# Patient Record
Sex: MALE | Race: WHITE | Employment: FULL TIME | URBAN - METROPOLITAN AREA
[De-identification: names, ages, dates, MRNs, and addresses within clinical notes are randomized per-mention and may not be internally consistent; named-entity substitution may affect disease eponyms.]

---

## 2017-03-23 ENCOUNTER — OFFICE VISIT (OUTPATIENT)
Dept: NEUROLOGY | Age: 27
End: 2017-03-23

## 2017-03-23 VITALS
HEART RATE: 64 BPM | BODY MASS INDEX: 20.9 KG/M2 | DIASTOLIC BLOOD PRESSURE: 85 MMHG | WEIGHT: 146 LBS | SYSTOLIC BLOOD PRESSURE: 142 MMHG | HEIGHT: 70 IN

## 2017-03-23 DIAGNOSIS — G40.309 GENERALIZED IDIOPATHIC EPILEPSY, NOT INTRACTABLE, WITHOUT STATUS EPILEPTICUS (HCC): Primary | ICD-10-CM

## 2017-03-23 PROCEDURE — 99214 OFFICE O/P EST MOD 30 MIN: CPT | Performed by: PSYCHIATRY & NEUROLOGY

## 2017-04-11 ENCOUNTER — TELEPHONE (OUTPATIENT)
Dept: NEUROLOGY | Age: 27
End: 2017-04-11

## 2018-04-16 ENCOUNTER — OFFICE VISIT (OUTPATIENT)
Dept: NEUROLOGY | Age: 28
End: 2018-04-16

## 2018-04-16 VITALS
HEIGHT: 71 IN | SYSTOLIC BLOOD PRESSURE: 131 MMHG | WEIGHT: 149.2 LBS | DIASTOLIC BLOOD PRESSURE: 75 MMHG | HEART RATE: 54 BPM | BODY MASS INDEX: 20.89 KG/M2

## 2018-04-16 DIAGNOSIS — G40.309 GENERALIZED IDIOPATHIC EPILEPSY, NOT INTRACTABLE, WITHOUT STATUS EPILEPTICUS (HCC): Primary | ICD-10-CM

## 2018-04-16 DIAGNOSIS — G40.309 NONINTRACTABLE GENERALIZED IDIOPATHIC EPILEPSY WITHOUT STATUS EPILEPTICUS (HCC): ICD-10-CM

## 2018-04-16 PROCEDURE — 99215 OFFICE O/P EST HI 40 MIN: CPT | Performed by: PSYCHIATRY & NEUROLOGY

## 2018-04-16 RX ORDER — DIVALPROEX SODIUM 500 MG
TABLET, EXTENDED RELEASE 24 HR ORAL
Qty: 360 TABLET | Refills: 0 | Status: SHIPPED | OUTPATIENT
Start: 2018-04-16 | End: 2018-05-10 | Stop reason: SDUPTHER

## 2018-04-19 DIAGNOSIS — G40.309 NONINTRACTABLE GENERALIZED IDIOPATHIC EPILEPSY WITHOUT STATUS EPILEPTICUS (HCC): ICD-10-CM

## 2018-04-19 DIAGNOSIS — G40.309 GENERALIZED IDIOPATHIC EPILEPSY, NOT INTRACTABLE, WITHOUT STATUS EPILEPTICUS (HCC): ICD-10-CM

## 2018-05-10 DIAGNOSIS — G40.309 GENERALIZED IDIOPATHIC EPILEPSY, NOT INTRACTABLE, WITHOUT STATUS EPILEPTICUS (HCC): ICD-10-CM

## 2018-05-10 RX ORDER — DIVALPROEX SODIUM 500 MG
TABLET, EXTENDED RELEASE 24 HR ORAL
Qty: 400 TABLET | Refills: 0
Start: 2018-05-10 | End: 2018-10-22 | Stop reason: SDUPTHER

## 2018-10-22 DIAGNOSIS — G40.309 GENERALIZED IDIOPATHIC EPILEPSY, NOT INTRACTABLE, WITHOUT STATUS EPILEPTICUS (HCC): ICD-10-CM

## 2018-10-22 RX ORDER — DIVALPROEX SODIUM 500 MG
TABLET, EXTENDED RELEASE 24 HR ORAL
Qty: 400 TABLET | Refills: 0 | Status: SHIPPED | OUTPATIENT
Start: 2018-10-15 | End: 2018-11-13 | Stop reason: SDUPTHER

## 2018-10-22 NOTE — TELEPHONE ENCOUNTER
We received Depakote 500 mg tablets from from [address] [place] [address]. The medications were picked up by Juan Romero 10/15//2018.

## 2018-11-13 ENCOUNTER — OFFICE VISIT (OUTPATIENT)
Dept: NEUROLOGY | Age: 28
End: 2018-11-13

## 2018-11-13 VITALS
HEART RATE: 58 BPM | BODY MASS INDEX: 21.14 KG/M2 | DIASTOLIC BLOOD PRESSURE: 83 MMHG | WEIGHT: 151 LBS | HEIGHT: 71 IN | SYSTOLIC BLOOD PRESSURE: 139 MMHG

## 2018-11-13 DIAGNOSIS — G40.309 GENERALIZED IDIOPATHIC EPILEPSY, NOT INTRACTABLE, WITHOUT STATUS EPILEPTICUS (HCC): ICD-10-CM

## 2018-11-13 DIAGNOSIS — G40.309 NONINTRACTABLE GENERALIZED IDIOPATHIC EPILEPSY WITHOUT STATUS EPILEPTICUS (HCC): Primary | ICD-10-CM

## 2018-11-13 PROCEDURE — 99214 OFFICE O/P EST MOD 30 MIN: CPT | Performed by: PSYCHIATRY & NEUROLOGY

## 2018-11-13 RX ORDER — DIVALPROEX SODIUM 500 MG
TABLET, EXTENDED RELEASE 24 HR ORAL
Qty: 400 TABLET | Refills: 0 | Status: SHIPPED | OUTPATIENT
Start: 2018-11-13 | End: 2019-02-25 | Stop reason: SDUPTHER

## 2018-11-13 NOTE — PROGRESS NOTES
discharges. He also feels \"Depakote has been controlling seizures well\". Denies history of photosensitivity. Previous Neurological Work-up:   CT Head ( 9/26/15 ): No evidence for acute intracranial pathology. No intracranial hemorrhage, mass; mass effect or midline shift. MRI Brain (7/31/14): normal.    EEG 2006: 4-6 hertz spike/wave generalized discharges. Current Outpatient Prescriptions on File Prior to Visit   Medication Sig Dispense Refill    DEPAKOTE  MG extended release tablet Take 4 tablets a bedtime. MALICK 400 tablet 0     No current facility-administered medications on file prior to visit. Allergies: Lyric Brice has No Known Allergies. Past Medical History:   Diagnosis Date    Epilepsy ([facility] [address]     Scoliosis        Past Surgical History:   Procedure Laterality Date    BACK SURGERY      HERNIA REPAIR       Social History: Lyric Brice  reports that he has quit smoking. His smoking use included Cigarettes. He has a 2.75 pack-year smoking history. He has never used smokeless tobacco. He reports that he drinks alcohol. He reports that he uses drugs, including Marijuana. Family History   Problem Relation Age of Onset    Diabetes Maternal Grandmother     High Blood Pressure Maternal Grandmother     Cancer Maternal Grandmother         breast    High Blood Pressure Maternal Grandfather     Heart Disease Maternal Grandfather     Migraines Mother     Diabetes Maternal Uncle        On exam: Blood pressure 139/83, pulse 58, height 5' 11\" (1.803 m), weight 151 lb (68.5 kg). GENERAL  Appears comfortable and in no distress   HEENT  NC/ AT   NECK  Supple and no bruits heard   MENTAL STATUS:  Alert, oriented, intact memory, no confusion, normal speech, normal language, no hallucination or delusion   CRANIAL NERVES: II     -      PERRLA, Fundi reveal intact venous pulsations;  Visual fields intact to confrontation  III,IV,VI -  EOMs full, no afferent defect, no SANDEEP, no ptosis  V     -     Normal facial sensation  VII    -     Normal facial symmetry  VIII   -     Intact hearing  IX,X -     Symmetrical palate  XI    -     Symmetrical shoulder shrug  XII   -     Midline tongue, no atrophy    MOTOR FUNCTION:  significant for good strength of grade 5/5 in bilateral proximal and distal muscle groups of both upper and lower extremities with normal bulk, normal tone and no involuntary movements, no tremor   SENSORY FUNCTION:  Normal touch, normal pin, normal vibration, normal proprioception   CEREBELLAR FUNCTION:  Intact fine motor control over upper limbs   REFLEX FUNCTION:  Symmetric, no perverted reflex, no Babinski sign   STATION and GAIT  Normal station, normal gait       Diagnostic data reviewed with the patient:       CBC:     Lab Results   Component Value Date    WBC 6.0 05/28/2015    HGB 14.9 05/28/2015     05/28/2015      BMP:     Lab Results   Component Value Date     05/28/2015    K 4.3 05/28/2015       Lab Results   Component Value Date    VALPROATE 94 05/28/2015       Lab Results   Component Value Date    ALT 18 05/28/2015         Blood work done at [facility] (4/17/18):  CBC: WBC 7.6, hemoglobin 16, hematocrit 47.9, ,000  Hepatic panel: AST 20, ALT 12, alkaline phosphatase 51, bili total 0.5  Valproic acid free level (4/17/18) 10.7 ( 4-50)            Impression and Plan: Mr. Saurav Inman is a 25 y.o. male with   Juvenile myoclonic epilepsy; generalized epilepsy since 2002; stable on Depakote 1 gr bid; last seizure activity was in May 2017; free valproic acid level is therapeutic at 10.7. Therapeutic monitoring: LFT; CBC and BMP were unremarkable as above. Discussion/counseling done regarding prognosis of juvenile myoclonic epilepsy. ,  Also discussed about avoidance of seizure precipitating factors such as sleep deprivation, substance abuse, medication compliance, etc.  He verbalized understanding all of those instructions. Depakote Medication Counseling: risks and benefits including possible adverse effects such as tremors and hepatic toxicity, etc; discussed with the patient and he verbalized understanding those instructions. Will repeat free and total valproic acid levels, CBC, BMP, LFT prior to follow-up visit. Follow-up in 6 months. Greater than 50% of visit was spent explaining the rationale for diagnosis and treatment. Please note that portions of this note were completed with a voice recognition program.  Although every effort was made to ensure the accuracy of this  automated transcription, some errors in transcription may have occurred, occasionally words are mis-transcribed.

## 2018-11-13 NOTE — PROGRESS NOTES
NEUROLOGY FOLLOW-UP  Patient Name:  Brendon Hubbard  :   1990  Clinic Visit Date: 2018        REVIEW OF SYSTEMS  Constitutional Weight changes: absent, Fevers : absent, Fatigue: absent; Any recent hospitalizations:  absent.    HEENT Ears: normal, Eyes: normal, Visual disturbance: absent   Reespiratory Shortness of breath: absent, Cough: present   Cardivascular Chest pain: absent, Leg swelling :absent   GI Constipation: absent, Diarrhea: absent, Swallowing change: absent    Urinary frequency: absent, Urinary urgency: absent, Urinary incontinence: absent   Musculoskeletal Neck pain: absent, Back pain: absent, Stiffness: absent, Muscle pain: absent, Joint pain: absent   Dermatological Hair loss: absent, Skin changes: absent   Neurological Memory loss: absent, Confusion: absent, Seizures: present; Trouble walking or imbalance: absent, Dizziness: absent, Weakness: absent, Numbness absent, Tremor: absent, Spasm: absent, Speech difficulty: absent, Headache: absent, Light sensitivity: absent   Psychiatric Anxiety: absent, Depression  absent, Suicidal ideations absent   Hematologic Abnormal bleeding: absent, Anemia: absent, Clotting disorder: absent, Lymph gland changes: absent

## 2019-02-11 ENCOUNTER — HOSPITAL ENCOUNTER (OUTPATIENT)
Age: 29
Setting detail: SPECIMEN
Discharge: HOME | End: 2019-02-11

## 2019-02-11 DIAGNOSIS — G40.309 NONINTRACTABLE GENERALIZED IDIOPATHIC EPILEPSY WITHOUT STATUS EPILEPTICUS (HCC): ICD-10-CM

## 2019-02-11 LAB
ABSOLUTE EOS #: 0.05 K/UL (ref 0–0.44)
ABSOLUTE IMMATURE GRANULOCYTE: <0.03 K/UL (ref 0–0.3)
ABSOLUTE LYMPH #: 1.98 K/UL (ref 1.1–3.7)
ABSOLUTE MONO #: 0.48 K/UL (ref 0.1–1.2)
ALT SERPL-CCNC: 13 U/L (ref 5–41)
ANION GAP SERPL CALCULATED.3IONS-SCNC: 14 MMOL/L (ref 9–17)
AST SERPL-CCNC: 15 U/L
BASOPHILS # BLD: 1 % (ref 0–2)
BASOPHILS ABSOLUTE: 0.03 K/UL (ref 0–0.2)
BUN BLDV-MCNC: 17 MG/DL (ref 6–20)
BUN/CREAT BLD: NORMAL (ref 9–20)
CALCIUM SERPL-MCNC: 9.8 MG/DL (ref 8.6–10.4)
CHLORIDE BLD-SCNC: 100 MMOL/L (ref 98–107)
CO2: 27 MMOL/L (ref 20–31)
CREAT SERPL-MCNC: 0.84 MG/DL (ref 0.7–1.2)
DIFFERENTIAL TYPE: NORMAL
EOSINOPHILS RELATIVE PERCENT: 1 % (ref 1–4)
GFR AFRICAN AMERICAN: >60 ML/MIN
GFR NON-AFRICAN AMERICAN: >60 ML/MIN
GFR SERPL CREATININE-BSD FRML MDRD: NORMAL ML/MIN/{1.73_M2}
GFR SERPL CREATININE-BSD FRML MDRD: NORMAL ML/MIN/{1.73_M2}
GLUCOSE BLD-MCNC: 97 MG/DL (ref 70–99)
HCT VFR BLD CALC: 48.7 % (ref 40.7–50.3)
HEMOGLOBIN: 16.1 G/DL (ref 13–17)
IMMATURE GRANULOCYTES: 0 %
LYMPHOCYTES # BLD: 37 % (ref 24–43)
MCH RBC QN AUTO: 31.4 PG (ref 25.2–33.5)
MCHC RBC AUTO-ENTMCNC: 33.1 G/DL (ref 28.4–34.8)
MCV RBC AUTO: 95.1 FL (ref 82.6–102.9)
MONOCYTES # BLD: 9 % (ref 3–12)
NRBC AUTOMATED: 0 PER 100 WBC
PDW BLD-RTO: 13.2 % (ref 11.8–14.4)
PLATELET # BLD: 238 K/UL (ref 138–453)
PLATELET ESTIMATE: NORMAL
PMV BLD AUTO: 11.2 FL (ref 8.1–13.5)
POTASSIUM SERPL-SCNC: 4.2 MMOL/L (ref 3.7–5.3)
RBC # BLD: 5.12 M/UL (ref 4.21–5.77)
RBC # BLD: NORMAL 10*6/UL
SEG NEUTROPHILS: 52 % (ref 36–65)
SEGMENTED NEUTROPHILS ABSOLUTE COUNT: 2.87 K/UL (ref 1.5–8.1)
SODIUM BLD-SCNC: 141 MMOL/L (ref 135–144)
VALPROIC ACID % FREE: 7.3 % (ref 5–18.4)
VALPROIC ACID LEVEL: 89 UG/ML (ref 50–125)
VALPROIC ACID, FREE: 6.5 UG/ML (ref 7–23)
VALPROIC DATE LAST DOSE: ABNORMAL
VALPROIC DOSE AMOUNT: ABNORMAL
VALPROIC TIME LAST DOSE: ABNORMAL
WBC # BLD: 5.4 K/UL (ref 3.5–11.3)
WBC # BLD: NORMAL 10*3/UL

## 2019-02-25 DIAGNOSIS — G40.309 GENERALIZED IDIOPATHIC EPILEPSY, NOT INTRACTABLE, WITHOUT STATUS EPILEPTICUS (HCC): ICD-10-CM

## 2019-02-25 RX ORDER — DIVALPROEX SODIUM 500 MG
TABLET, EXTENDED RELEASE 24 HR ORAL
Qty: 400 TABLET | Refills: 0
Start: 2019-02-25 | End: 2019-05-07 | Stop reason: SDUPTHER

## 2019-05-07 DIAGNOSIS — G40.309 GENERALIZED IDIOPATHIC EPILEPSY, NOT INTRACTABLE, WITHOUT STATUS EPILEPTICUS (HCC): ICD-10-CM

## 2019-05-07 RX ORDER — DIVALPROEX SODIUM 500 MG
TABLET, EXTENDED RELEASE 24 HR ORAL
Qty: 400 TABLET | Refills: 0
Start: 2019-05-07 | End: 2019-05-13 | Stop reason: SDUPTHER

## 2019-05-07 NOTE — TELEPHONE ENCOUNTER
We received Depakote 500 mg tablets from from [address] [place] [address]. The medications were picked up by Fabiola Bragg  5/2/2019.

## 2019-05-13 ENCOUNTER — OFFICE VISIT (OUTPATIENT)
Dept: NEUROLOGY | Age: 29
End: 2019-05-13

## 2019-05-13 VITALS
HEART RATE: 71 BPM | BODY MASS INDEX: 21.08 KG/M2 | WEIGHT: 150.6 LBS | SYSTOLIC BLOOD PRESSURE: 137 MMHG | HEIGHT: 71 IN | DIASTOLIC BLOOD PRESSURE: 88 MMHG

## 2019-05-13 DIAGNOSIS — G40.309 GENERALIZED IDIOPATHIC EPILEPSY, NOT INTRACTABLE, WITHOUT STATUS EPILEPTICUS (HCC): ICD-10-CM

## 2019-05-13 PROCEDURE — 99214 OFFICE O/P EST MOD 30 MIN: CPT | Performed by: NURSE PRACTITIONER

## 2019-05-13 RX ORDER — DIVALPROEX SODIUM 500 MG
TABLET, EXTENDED RELEASE 24 HR ORAL
Qty: 400 TABLET | Refills: 2 | Status: SHIPPED | OUTPATIENT
Start: 2019-05-13 | End: 2019-09-20 | Stop reason: SDUPTHER

## 2019-05-13 NOTE — PROGRESS NOTES
[facility]            [address]          [facility], [address] [facility]          Dept: [phone]          Dept Fax: [phone]        MD Avtar Oquendo MD Ahmed B. Orson Manuel, MD Sherwood Olden, MD Higinio Bimler, MD Radha Wilkerson, CNP            5/13/2019      HISTORY OF PRESENT ILLNESS:       I had the pleasure of seeing Ju Olivares, who returns for continuing neurologic care. Patient is a 64-year-old man who was seen last on November 13, 2018 for evaluation of generalized epilepsy. The patient's seizures began at the age of 13 in 2002 and his done very well for years on Depakote monotherapy. He did have breakthrough seizures when he was using the generic Depakote preparation. His last seizure was in May 2017. The patient has complex partial seizures with secondary generalization. All of the patient's previous records and imaging studies were carefully reviewed. Patient has remained seizure-free since his last visit and is stable on Depakote ER 2000 mg at bedtime daily. His last laboratory studies on February 11, 2019 revealed valproic acid total level 89, free level VI.5, with a normal ALT and AST. Patient does state when he wakes up in the morning that he feels very stiff and tired. 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 Prior testing reviewed:    CT Head ( 9/26/15 ): No evidence for acute intracranial pathology. No intracranial hemorrhage, mass; mass effect or midline shift. MRI Brain (7/31/14): normal.    EEG 2006: 4-6 hertz spike/wave generalized discharges.                    PAST MEDICAL HISTORY:         Diagnosis Date    Epilepsy ([facility] [address]     Scoliosis         PAST SURGICAL HISTORY:         Procedure Laterality Date    BACK SURGERY      HERNIA REPAIR          SOCIAL HISTORY: Social History     Socioeconomic History    Marital status: Single     Spouse name: Not on file    Number of children: Not on file    Years of education: Not on file    Highest education level: Not on file   Occupational History    Not on file   Social Needs    Financial resource strain: Not on file    Food insecurity:     Worry: Not on file     Inability: Not on file    Transportation needs:     Medical: Not on file     Non-medical: Not on file   Tobacco Use    Smoking status: Former Smoker     Packs/day: 0.25     Years: 11.00     Pack years: 2.75     Types: Cigarettes    Smokeless tobacco: Never Used    Tobacco comment: pt uses electronic cigarettes   Substance and Sexual Activity    Alcohol use: Yes     Alcohol/week: 0.0 oz     Comment: occassionally    Drug use: Yes     Types: Marijuana    Sexual activity: Yes     Partners: Female   Lifestyle    Physical activity:     Days per week: Not on file     Minutes per session: Not on file    Stress: Not on file   Relationships    Social connections:     Talks on phone: Not on file     Gets together: Not on file     Attends Taoism service: Not on file     Active member of club or organization: Not on file     Attends meetings of clubs or organizations: Not on file     Relationship status: Not on file    Intimate partner violence:     Fear of current or ex partner: Not on file     Emotionally abused: Not on file     Physically abused: Not on file     Forced sexual activity: Not on file   Other Topics Concern    Not on file   Social History Narrative    Not on file       CURRENT MEDICATIONS:     Current Outpatient Medications   Medication Sig Dispense Refill    DEPAKOTE  MG extended release tablet Take 4 tablets a bedtime. MALICK 400 tablet 0     No current facility-administered medications for this visit.          ALLERGIES:   No Known Allergies                              REVIEW OF SYSTEMS    CONSTITUTIONAL Weight: absent, Appetite: absent, Fatigue: absent      HEENT Ears: normal, Visual disturbance: absent   RESPIRATORY Shortness of breath: absent, Cough: absent   CARDIOVASCULAR Chest pain: absent, Leg swelling :absent      GI Constipation: absent, Diarrhea: absent, Swallowing change: absent       Urinary frequency: absent, Urinary urgency: absent, Urinary incontinence: absent   MUSCULOSKELETAL Neck pain: absent, Back pain: absent, Stiffness: absent, Muscle pain: absent, Joint pain: absent Restless legs: absent   DERMATOLOGIC Hair loss: absent, Skin changes: absent   NEUROLOGIC Memory loss: absent, Confusion: absent, Seizures: absent Trouble walking or imbalance: absent, Dizziness: absent, Weakness: present, Numbness: absent Tremor: absent, Spasm: absent, Speech difficulty: absent, Headache: absent, Light sensitivity: absent   PSYCHIATRIC Anxiety: absent, Hallucination: absent, Mood disorder: absent   HEMATOLOGIC Abnormal bleeding: absent, Anemia: absent, Clotting disorder: absent, Lymph gland changes: absent           PHYSICAL EXAMINATION:       There were no vitals filed for this visit.                                            .                                                                                                    General Appearance:  Alert, cooperative, no signs of distress, appears stated age   Head:  Normocephalic, no signs of trauma   Eyes:  Conjunctiva/corneas clear;  eyelids intact   Ears:  Normal external ear and canals   Nose: Nares normal, mucosa normal, no drainage    Throat: Lips and tongue normal; teeth normal;  gums normal   Neck: Supple, intact flexion, extension and rotation;   trachea midline;  no adenopathy;   thyroid: not enlarged;   no carotid pulse abnormality   Back:   Symmetric, no curvature, ROM adequate   Lungs:   Respirations unlabored   Heart:  Regular rate and rhythm           Extremities: Extremities normal, no cyanosis, no edema   Pulses: Symmetric over head and neck   Skin: Skin color, texture normal, no he has any breakthrough seizure activity  4. He will have repeat laboratory testing after his next visit, since he has no health insurance.             Signed: Eva Carmen CNP      *Please note that portions of this note were completed with a voice recognition program.  Although every effort was made to insure the accuracy of this automated transcription, some errors in transcription may have occurred, occasionally words and are mis-transcribed

## 2019-09-20 DIAGNOSIS — G40.309 GENERALIZED IDIOPATHIC EPILEPSY, NOT INTRACTABLE, WITHOUT STATUS EPILEPTICUS (HCC): ICD-10-CM

## 2019-09-20 RX ORDER — DIVALPROEX SODIUM 500 MG
TABLET, EXTENDED RELEASE 24 HR ORAL
Qty: 400 TABLET | Refills: 0
Start: 2019-09-20 | End: 2019-12-31 | Stop reason: SDUPTHER

## 2020-01-06 NOTE — TELEPHONE ENCOUNTER
Our office had not been able to reach [place] to cancel his 5/13/2019 appointment with you. He showed up for the appointment so Shady Miller saw him. He is scheduled to see you in follow up on 3/12/2020.

## 2020-01-08 RX ORDER — DIVALPROEX SODIUM 500 MG
TABLET, EXTENDED RELEASE 24 HR ORAL
Qty: 400 TABLET | Refills: 0
Start: 2020-01-08 | End: 2020-03-12 | Stop reason: SDUPTHER

## 2020-03-12 ENCOUNTER — OFFICE VISIT (OUTPATIENT)
Dept: NEUROLOGY | Age: 30
End: 2020-03-12

## 2020-03-12 VITALS
HEART RATE: 57 BPM | TEMPERATURE: 97.5 F | SYSTOLIC BLOOD PRESSURE: 137 MMHG | BODY MASS INDEX: 20.08 KG/M2 | HEIGHT: 71 IN | WEIGHT: 143.4 LBS | DIASTOLIC BLOOD PRESSURE: 81 MMHG

## 2020-03-12 PROCEDURE — 99214 OFFICE O/P EST MOD 30 MIN: CPT | Performed by: PSYCHIATRY & NEUROLOGY

## 2020-03-12 RX ORDER — DIVALPROEX SODIUM 500 MG
TABLET, EXTENDED RELEASE 24 HR ORAL
Qty: 120 TABLET | Refills: 0 | Status: SHIPPED | OUTPATIENT
Start: 2020-03-12 | End: 2020-03-16 | Stop reason: SDUPTHER

## 2020-03-12 NOTE — PROGRESS NOTES
NEUROLOGY FOLLOW UP VISIT  Patient Name:       Brandt Case  :        1990  Clinic Visit Date:    3/12/2020    Dear Dr. Suleman Esteban MD     I saw . Brandt Case in follow-up in the office today in continuation of neurologic care. As you know he  is a 30 y.o. right handed  male with juvenile myoclonic epilepsy; generalized epilepsy since ; stable on Depakote ER and seizure free with last seizure activity in May 2017. Today he comes to the clinic stating that he has not had any med related adverse effects. Denied tremors, headache, weight gain and confusion spells, etc.   He hasn't had blood work \"for about a year\". Of note: he has long standing hx of seizure disorder since age 13 in 1. His seizures were described as generalized tonic-clonic seizures with intermittent myoclonic seizures. He has had aura prior to having the seizure and has had abnormal tingling sensations involving extremities and he would lose awareness with loss of consciousness. He has had tongue bite in the past but never had bladder incontinence. Also denied bowel incontinence. His seizure frequency is quite variable. He has much more number of seizures in the past occurring 2 or 3 seizures yearly but for the last \"couple of years, they're occurring one seizure every other year or so\". At times his seizures occur in clusters. He admits to medication compliance. He never had any medication related adverse effects from Depakote. Denied family history of seizures. Denied history of meningitis and encephalitis. Denied history of head injury or trauma. His seizures used to occur predominantly during day time. His family members and friends had witnessed those seizures he has had CT head and MRI brain in the past and those were unremarkable. He also had EEG with significant abnormalities revealing 4-6 hertz generalized spike and slow wave discharges.   He also feels \"Depakote has been controlling seizures well\". Denies history of photosensitivity. Review of systems done by staff reviewed and pertinent positives include absence of any seizure activity and absence of any recent hospitalization. Previous Neurological Work-up:   CT Head ( 9/26/15 ): No evidence for acute intracranial pathology. No intracranial hemorrhage, mass; mass effect or midline shift. MRI Brain (7/31/14): normal.    EEG 2006: 4-6 hertz spike/wave generalized discharges. Current Outpatient Medications on File Prior to Visit   Medication Sig Dispense Refill    DEPAKOTE  MG extended release tablet Take 4 tablets a bedtime. MALICK 400 tablet 0     No current facility-administered medications on file prior to visit. Allergies: Mitra Lance has No Known Allergies. Past Medical History:   Diagnosis Date    Epilepsy ([facility] [address]     Scoliosis        Past Surgical History:   Procedure Laterality Date    BACK SURGERY      HERNIA REPAIR       Social History: Mitra Lance  reports that he quit smoking about 17 months ago. His smoking use included cigarettes. He has a 2.75 pack-year smoking history. He has never used smokeless tobacco. He reports current alcohol use. He reports current drug use. Drug: Marijuana. Family History   Problem Relation Age of Onset    Diabetes Maternal Grandmother     High Blood Pressure Maternal Grandmother     Cancer Maternal Grandmother         breast    High Blood Pressure Maternal Grandfather     Heart Disease Maternal Grandfather     Migraines Mother     Diabetes Maternal Uncle        On exam: Blood pressure 137/81, pulse 57, temperature 97.5 °F (36.4 °C), height 5' 11\" (1.803 m), weight 143 lb 6.4 oz (65 kg).     GENERAL  Appears comfortable and in no distress   HEENT  NC/ AT   NECK  Supple and no bruits heard   MENTAL STATUS:  Alert, oriented, intact memory, no confusion, normal speech, normal language, no hallucination or delusion; appropriate affect CRANIAL NERVES: II     -      PERRLA, Fundi reveal intact venous pulsations; Visual fields intact to confrontation  III,IV,VI -  EOMs full, no afferent defect, no  SANDEEP, no ptosis  V     -     Normal facial sensation  VII    -     Normal facial symmetry  VIII   -     Intact hearing  IX,X -     Symmetrical palate  XI    -     Symmetrical shoulder shrug  XII   -     Midline tongue, no atrophy    MOTOR FUNCTION:  significant for good strength of grade 5/5 in bilateral proximal and distal muscle groups of both upper and lower extremities with normal bulk, normal tone and no involuntary movements, no tremor   SENSORY FUNCTION:  Normal touch, normal pin, normal vibration, normal proprioception   CEREBELLAR FUNCTION:  Intact fine motor control over upper limbs   REFLEX FUNCTION:  Symmetric, no perverted reflex, no Babinski sign   STATION and GAIT  Normal station, normal gait       Diagnostic data reviewed with the patient:       CBC:     Lab Results   Component Value Date    WBC 5.4 02/11/2019    HGB 16.1 02/11/2019     02/11/2019      BMP:     Lab Results   Component Value Date     02/11/2019    K 4.2 02/11/2019    GLUCOSE 97 02/11/2019    CALCIUM 9.8 02/11/2019       Lab Results   Component Value Date    VALPROATE 89 02/11/2019    VALPROATE 6.5 (L) 02/11/2019       Lab Results   Component Value Date    ALT 13 02/11/2019    AST 15 02/11/2019             Impression and Plan: Mr. Fabio Romero is a 30 y.o. male with   Juvenile myoclonic epilepsy; generalized epilepsy since 2002; stable on Depakote ER 2gr daily; last seizure activity was in May 2017; will check free and total valproic acid levels along with LFT; CBC and BMP in therapeutic monitoring. Discussion/counseling done regarding prognosis of juvenile myoclonic epilepsy. ,  Also discussed about avoidance of seizure precipitating factors such as sleep deprivation, substance abuse, medication compliance, etc.  He verbalized understanding all of those instructions. Depakote Medication Counseling: risks and benefits including possible adverse effects such as tremors and hepatic toxicity, etc; discussed with the patient and he verbalized understanding those instructions. Will repeat free and total valproic acid levels, CBC, BMP, LFT prior in next 1-2 days. Greater than 50% of visit was spent explaining the rationale for diagnosis and treatment. Follow up in 6 months. Please note that portions of this note were completed with a voice recognition program.  Although every effort was made to ensure the accuracy of this automated transcription, some errors in transcription may have occurred; occasionally words are mis-transcribed. Thank you for understanding.

## 2020-03-12 NOTE — LETTER
or so\". At times his seizures occur in clusters. He admits to medication compliance. He never had any medication related adverse effects from Depakote. Denied family history of seizures. Denied history of meningitis and encephalitis. Denied history of head injury or trauma. His seizures used to occur predominantly during day time. His family members and friends had witnessed those seizures he has had CT head and MRI brain in the past and those were unremarkable. He also had EEG with significant abnormalities revealing 46 hertz generalized spike and slow wave discharges. He also feels \"Depakote has been controlling seizures well\". Denies history of photosensitivity. Review of systems done by staff reviewed and pertinent positives include absence of any seizure activity and absence of any recent hospitalization. Previous Neurological Work-up:   CT Head ( 9/26/15 ): No evidence for acute intracranial pathology. No intracranial hemorrhage, mass; mass effect or midline shift. MRI Brain (7/31/14): normal.    EEG 2006: 46 hertz spike/wave generalized discharges. Current Outpatient Medications on File Prior to Visit   Medication Sig Dispense Refill    DEPAKOTE  MG extended release tablet Take 4 tablets a bedtime. MALICK 400 tablet 0     No current facility-administered medications on file prior to visit. Allergies: Tarny Natarajan has No Known Allergies. Past Medical History:   Diagnosis Date    Epilepsy ([address]     Scoliosis        Past Surgical History:   Procedure Laterality Date    BACK SURGERY      HERNIA REPAIR       Social History: Taryn Natarajan  reports that he quit smoking about 17 months ago. His smoking use included cigarettes. He has a 2.75 pack-year smoking history. He has never used smokeless tobacco. He reports current alcohol use. He reports current drug use. Drug: Marijuana.     Family History   Problem Relation Age of Onset

## 2020-03-12 NOTE — PROGRESS NOTES
NEUROLOGY FOLLOW-UP  Patient Name:  Paulino Bradley  :   1990  Clinic Visit Date: 3/12/2020        REVIEW OF SYSTEMS  Constitutional Weight changes: absent, Fevers : absent, Fatigue: absent; Any recent hospitalizations:  absent.    HEENT Ears: normal, Eyes: no correction, Visual disturbance: absent   Reespiratory Shortness of breath: absent, Cough: absent   Cardivascular Chest pain: absent, Leg swelling :absent   GI Constipation: absent, Diarrhea: absent, Swallowing change: absent    Urinary frequency: absent, Urinary urgency: absent, Urinary incontinence: absent   Musculoskeletal Neck pain: absent, Back pain: absent, Stiffness: absent, Muscle pain: absent, Joint pain: absent   Dermatological Hair loss: absent, Skin changes: absent   Neurological Memory loss: absent, Confusion: absent, Seizures: absent; Trouble walking or imbalance: absent, Dizziness: absent, Weakness: absent, Numbness absent, Tremor: absent, Spasm: absent, Speech difficulty: absent, Headache: absent, Light sensitivity: absent   Psychiatric Anxiety: absent, Depression  absent, Suicidal ideations absent   Hematologic Abnormal bleeding: absent, Anemia: absent, Clotting disorder: absent, Lymph gland changes: absent

## 2020-03-13 ENCOUNTER — HOSPITAL ENCOUNTER (OUTPATIENT)
Age: 30
Setting detail: SPECIMEN
Discharge: HOME | End: 2020-03-13

## 2020-03-13 LAB
ALT SERPL-CCNC: 30 U/L (ref 5–41)
ANION GAP SERPL CALCULATED.3IONS-SCNC: 14 MMOL/L (ref 9–17)
AST SERPL-CCNC: 28 U/L
BUN BLDV-MCNC: 15 MG/DL (ref 6–20)
BUN/CREAT BLD: NORMAL (ref 9–20)
CALCIUM SERPL-MCNC: 9.8 MG/DL (ref 8.6–10.4)
CHLORIDE BLD-SCNC: 101 MMOL/L (ref 98–107)
CO2: 27 MMOL/L (ref 20–31)
CREAT SERPL-MCNC: 0.72 MG/DL (ref 0.7–1.2)
GFR AFRICAN AMERICAN: >60 ML/MIN
GFR NON-AFRICAN AMERICAN: >60 ML/MIN
GFR SERPL CREATININE-BSD FRML MDRD: NORMAL ML/MIN/{1.73_M2}
GFR SERPL CREATININE-BSD FRML MDRD: NORMAL ML/MIN/{1.73_M2}
GLUCOSE BLD-MCNC: 74 MG/DL (ref 70–99)
HCT VFR BLD CALC: 50.1 % (ref 40.7–50.3)
HEMOGLOBIN: 16.3 G/DL (ref 13–17)
MCH RBC QN AUTO: 31.1 PG (ref 25.2–33.5)
MCHC RBC AUTO-ENTMCNC: 32.5 G/DL (ref 28.4–34.8)
MCV RBC AUTO: 95.6 FL (ref 82.6–102.9)
NRBC AUTOMATED: 0 PER 100 WBC
PDW BLD-RTO: 13.7 % (ref 11.8–14.4)
PLATELET # BLD: 200 K/UL (ref 138–453)
PMV BLD AUTO: 11.9 FL (ref 8.1–13.5)
POTASSIUM SERPL-SCNC: 4.5 MMOL/L (ref 3.7–5.3)
RBC # BLD: 5.24 M/UL (ref 4.21–5.77)
SODIUM BLD-SCNC: 142 MMOL/L (ref 135–144)
VALPROIC ACID % FREE: 8.7 % (ref 5–18.4)
VALPROIC ACID LEVEL: 95 UG/ML (ref 50–125)
VALPROIC ACID, FREE: 8.3 UG/ML (ref 7–23)
VALPROIC DATE LAST DOSE: NORMAL
VALPROIC DOSE AMOUNT: NORMAL
VALPROIC TIME LAST DOSE: NORMAL
WBC # BLD: 6.6 K/UL (ref 3.5–11.3)

## 2020-03-16 RX ORDER — DIVALPROEX SODIUM 500 MG
TABLET, EXTENDED RELEASE 24 HR ORAL
Qty: 400 TABLET | Refills: 0
Start: 2020-03-16 | End: 2020-06-26 | Stop reason: SDUPTHER

## 2020-03-16 NOTE — TELEPHONE ENCOUNTER
Medication supplied by [facility] patient assistance program. Mora Goes pick the medication up from the office today.

## 2020-06-29 RX ORDER — DIVALPROEX SODIUM 500 MG
TABLET, EXTENDED RELEASE 24 HR ORAL
Qty: 400 TABLET | Refills: 0
Start: 2020-06-29 | End: 2020-09-24 | Stop reason: SDUPTHER

## 2020-08-25 ENCOUNTER — TELEPHONE (OUTPATIENT)
Dept: NEUROLOGY | Age: 30
End: 2020-08-25

## 2020-09-10 ENCOUNTER — TELEPHONE (OUTPATIENT)
Dept: NEUROLOGY | Age: 30
End: 2020-09-10

## 2020-09-11 NOTE — TELEPHONE ENCOUNTER
Patient's grandmother called the office back. She stated that they called [facility] and took care of this.

## 2020-09-24 ENCOUNTER — OFFICE VISIT (OUTPATIENT)
Dept: NEUROLOGY | Age: 30
End: 2020-09-24

## 2020-09-24 VITALS
HEART RATE: 63 BPM | HEIGHT: 71 IN | SYSTOLIC BLOOD PRESSURE: 137 MMHG | DIASTOLIC BLOOD PRESSURE: 85 MMHG | TEMPERATURE: 97.4 F | BODY MASS INDEX: 21.19 KG/M2 | WEIGHT: 151.4 LBS

## 2020-09-24 PROCEDURE — 99214 OFFICE O/P EST MOD 30 MIN: CPT | Performed by: PSYCHIATRY & NEUROLOGY

## 2020-09-24 RX ORDER — DIVALPROEX SODIUM 500 MG
TABLET, EXTENDED RELEASE 24 HR ORAL
Qty: 400 TABLET | Refills: 0 | Status: SHIPPED | OUTPATIENT
Start: 2020-09-24 | End: 2020-11-06 | Stop reason: SDUPTHER

## 2020-09-24 NOTE — PROGRESS NOTES
NEUROLOGY FOLLOW UP VISIT  Patient Name:       Deidre Bauman  :        1990  Clinic Visit Date:    2020    Dear Dr. Rod Davis MD     I saw Mr. Deidre Bauman in follow-up in the office today in continuation of neurologic care. As you know he  is a 26 y.o. right handed  male with juvenile myoclonic epilepsy; generalized epilepsy since ; comes to the clinic stating that he is starting a new job in the next few days in first week of October. His job needs fine detailing. He is wondering whether fine tremors would affect his functioning in any way. Presently he does not feel that his tremors are not at all bothersome. They are not affecting his daily routine activities. He also stated that he has been \"doing well\" on Depakote ER and seizure free with last seizure activity in May 2017. He has not been suffering from headaches, weight gain and confusion spells, etc.     Of note: he has long standing hx of seizure disorder since age 13 in 1. His seizures were described as generalized tonic-clonic seizures with intermittent myoclonic seizures. He has had aura prior to having the seizure and has had abnormal tingling sensations involving extremities and he would lose awareness with loss of consciousness. He has had tongue bite in the past but never had bladder incontinence. Also denied bowel incontinence. His seizure frequency is quite variable. He has much more number of seizures in the past occurring 2 or 3 seizures yearly but for the last \"couple of years, they're occurring one seizure every other year or so\". At times his seizures occur in clusters. He admits to medication compliance. He never had any medication related adverse effects from Depakote. Denied family history of seizures. Denied history of meningitis and encephalitis. Denied history of head injury or trauma. His seizures used to occur predominantly during day time.   His family members and fine detailing; discussion done about Depakote-induced fine tremors. Will check the levels today and address dosing accordingly. Will also check LFTs at patient's request.  Presently he is not having any symptomatology but he wants to check liver enzymes. Therapeutic monitoring: free and total valproic acid levels along with LFT; CBC and BMP from 3/13/20 are in therapeutic range. If tremors are interfering with his work; he is informed that there are other possible alternatives including Keppra, Lamictal or Topamax as all of these medications are broad-spectrum agents with noticeable evidence of efficacy in [place]     Discussion/counseling done regarding prognosis of juvenile myoclonic epilepsy. ,  Also discussed about avoidance of seizure precipitating factors such as sleep deprivation, substance abuse, medication compliance, etc.  He verbalized understanding all of those instructions. Depakote Medication Counseling: risks and benefits including possible adverse effects such as tremors and hepatic toxicity, etc; discussed with the patient and he verbalized understanding those instructions. Will repeat free and total valproic acid levels, CBC, BMP, LFT prior in next 1-2 days. Greater than 50% of visit was spent explaining the rationale for diagnosis and treatment. Follow up in 3 months. Please note that portions of this note were completed with a voice recognition program.  Although every effort was made to ensure the accuracy of this automated transcription, some errors in transcription may have occurred; occasionally words are mis-transcribed. Thank you for understanding.

## 2020-11-07 RX ORDER — DIVALPROEX SODIUM 500 MG
TABLET, EXTENDED RELEASE 24 HR ORAL
Qty: 400 TABLET | Refills: 0
Start: 2020-11-07 | End: 2021-04-20 | Stop reason: SDUPTHER

## 2021-01-18 ENCOUNTER — TELEPHONE (OUTPATIENT)
Dept: NEUROLOGY | Age: 31
End: 2021-01-18

## 2021-04-15 ENCOUNTER — TELEPHONE (OUTPATIENT)
Dept: NEUROLOGY | Age: 31
End: 2021-04-15

## 2021-04-15 NOTE — TELEPHONE ENCOUNTER
I called Lydia Berumen and let him know that the Depakote from the patient assistant program was here. He voiced understanding and will pick it up.

## 2021-04-20 DIAGNOSIS — G40.309 GENERALIZED IDIOPATHIC EPILEPSY, NOT INTRACTABLE, WITHOUT STATUS EPILEPTICUS (HCC): ICD-10-CM

## 2021-04-20 NOTE — TELEPHONE ENCOUNTER
Depakote  mg tablets were received from the [facility] patient assistants program. The medication was picked up from the office by John chaidez.

## 2021-04-21 RX ORDER — DIVALPROEX SODIUM 500 MG
TABLET, EXTENDED RELEASE 24 HR ORAL
Qty: 400 TABLET | Refills: 0
Start: 2021-04-21 | End: 2021-12-03 | Stop reason: SDUPTHER

## 2021-06-25 ENCOUNTER — TELEPHONE (OUTPATIENT)
Dept: NEUROLOGY | Age: 31
End: 2021-06-25

## 2021-07-12 ENCOUNTER — OFFICE VISIT (OUTPATIENT)
Dept: FAMILY MEDICINE CLINIC | Age: 31
End: 2021-07-12
Payer: COMMERCIAL

## 2021-07-12 ENCOUNTER — HOSPITAL ENCOUNTER (OUTPATIENT)
Age: 31
Setting detail: SPECIMEN
Discharge: HOME | End: 2021-07-12
Payer: COMMERCIAL

## 2021-07-12 VITALS
DIASTOLIC BLOOD PRESSURE: 76 MMHG | WEIGHT: 148 LBS | BODY MASS INDEX: 20.72 KG/M2 | SYSTOLIC BLOOD PRESSURE: 126 MMHG | TEMPERATURE: 97.2 F | HEIGHT: 71 IN | HEART RATE: 76 BPM | OXYGEN SATURATION: 98 %

## 2021-07-12 DIAGNOSIS — Z51.81 MEDICATION MONITORING ENCOUNTER: ICD-10-CM

## 2021-07-12 DIAGNOSIS — G40.309 GENERALIZED IDIOPATHIC EPILEPSY, NOT INTRACTABLE, WITHOUT STATUS EPILEPTICUS (HCC): Primary | ICD-10-CM

## 2021-07-12 DIAGNOSIS — Z11.4 ENCOUNTER FOR SCREENING FOR HUMAN IMMUNODEFICIENCY VIRUS (HIV): ICD-10-CM

## 2021-07-12 DIAGNOSIS — G40.309 GENERALIZED IDIOPATHIC EPILEPSY, NOT INTRACTABLE, WITHOUT STATUS EPILEPTICUS (HCC): ICD-10-CM

## 2021-07-12 DIAGNOSIS — Z11.59 NEED FOR HEPATITIS C SCREENING TEST: ICD-10-CM

## 2021-07-12 LAB
ALBUMIN SERPL-MCNC: 4.5 G/DL (ref 3.5–5.2)
ALBUMIN/GLOBULIN RATIO: 1.4 (ref 1–2.5)
ALP BLD-CCNC: 41 U/L (ref 40–129)
ALT SERPL-CCNC: 23 U/L (ref 5–41)
AST SERPL-CCNC: 27 U/L
BILIRUB SERPL-MCNC: 0.34 MG/DL (ref 0.3–1.2)
BILIRUBIN DIRECT: 0.09 MG/DL
BILIRUBIN, INDIRECT: 0.25 MG/DL (ref 0–1)
GLOBULIN: NORMAL G/DL (ref 1.5–3.8)
HIV AG/AB: NONREACTIVE
TOTAL PROTEIN: 7.7 G/DL (ref 6.4–8.3)
VALPROIC ACID % FREE: 9.1 % (ref 5–18.4)
VALPROIC ACID LEVEL: 91 UG/ML (ref 50–125)
VALPROIC ACID, FREE: 8.3 UG/ML (ref 7–23)
VALPROIC DATE LAST DOSE: NORMAL
VALPROIC DOSE AMOUNT: NORMAL
VALPROIC TIME LAST DOSE: NORMAL

## 2021-07-12 PROCEDURE — 99213 OFFICE O/P EST LOW 20 MIN: CPT | Performed by: NURSE PRACTITIONER

## 2021-07-12 PROCEDURE — 1036F TOBACCO NON-USER: CPT | Performed by: NURSE PRACTITIONER

## 2021-07-12 PROCEDURE — G8427 DOCREV CUR MEDS BY ELIG CLIN: HCPCS | Performed by: NURSE PRACTITIONER

## 2021-07-12 PROCEDURE — G8420 CALC BMI NORM PARAMETERS: HCPCS | Performed by: NURSE PRACTITIONER

## 2021-07-12 SDOH — ECONOMIC STABILITY: FOOD INSECURITY: WITHIN THE PAST 12 MONTHS, YOU WORRIED THAT YOUR FOOD WOULD RUN OUT BEFORE YOU GOT MONEY TO BUY MORE.: NEVER TRUE

## 2021-07-12 SDOH — ECONOMIC STABILITY: FOOD INSECURITY: WITHIN THE PAST 12 MONTHS, THE FOOD YOU BOUGHT JUST DIDN'T LAST AND YOU DIDN'T HAVE MONEY TO GET MORE.: NEVER TRUE

## 2021-07-12 ASSESSMENT — ENCOUNTER SYMPTOMS
SHORTNESS OF BREATH: 0
RHINORRHEA: 0
COUGH: 0
CONSTIPATION: 0
SORE THROAT: 0
DIARRHEA: 0

## 2021-07-12 ASSESSMENT — PATIENT HEALTH QUESTIONNAIRE - PHQ9
2. FEELING DOWN, DEPRESSED OR HOPELESS: 0
SUM OF ALL RESPONSES TO PHQ QUESTIONS 1-9: 0
SUM OF ALL RESPONSES TO PHQ9 QUESTIONS 1 & 2: 0
1. LITTLE INTEREST OR PLEASURE IN DOING THINGS: 0
SUM OF ALL RESPONSES TO PHQ QUESTIONS 1-9: 0
SUM OF ALL RESPONSES TO PHQ QUESTIONS 1-9: 0

## 2021-07-12 ASSESSMENT — SOCIAL DETERMINANTS OF HEALTH (SDOH): HOW HARD IS IT FOR YOU TO PAY FOR THE VERY BASICS LIKE FOOD, HOUSING, MEDICAL CARE, AND HEATING?: NOT HARD AT ALL

## 2021-07-12 NOTE — PATIENT INSTRUCTIONS
It was my pleasure to meet with you today. Please contact me with any questions or concerns, and please notify myself or our manager if there is anyway we can improve our service in your health care needs.  Below I have listed some instructions and information that pertain to today's visit.    -You have been advised to continue all current medication, otherwise not discussed in today's visit  -New medications and refills will been sent and made available at pharmacy or mail away  -Heathy daily diet to include low salt, low fat heart healthy and low carbohydrate, low sugar diabetic diet  -Drink 6-8 glasses of water daily Normal rate, regular rhythm.  Heart sounds S1, S2.  No murmurs, rubs or gallops. +minimal ankle nonpitting edema.

## 2021-07-12 NOTE — PROGRESS NOTES
[address] [facility]   [address]   [phone]    2021     CHIEF COMPLAINT:     Cecile Gr (:  1990) is a 26 y.o. male, here for evaluation of the following chief complaint(s):  Seizures and Other (State form for DL completion)      REVIEWED INFORMATION      No Known Allergies    Current Outpatient Medications   Medication Sig Dispense Refill    DEPAKOTE  MG extended release tablet Take 4 tablets a bedtime. MALICK 400 tablet 0     No current facility-administered medications for this visit. Patient Care Team:  Susan Phalen, MD as PCP - Ruby Campa MD as Consulting Physician (Neurology)    REVIEW OF SYSTEMS:     Review of Systems   Constitutional: Negative for activity change, fatigue and unexpected weight change. HENT: Negative for congestion, ear pain, hearing loss, rhinorrhea and sore throat. Respiratory: Negative for cough and shortness of breath. Cardiovascular: Negative for chest pain, palpitations and leg swelling. Gastrointestinal: Negative for constipation and diarrhea. Musculoskeletal: Negative for arthralgias and gait problem. Neurological: Positive for seizures (last seizure noted in ). Negative for dizziness, weakness and headaches. Psychiatric/Behavioral: Negative for confusion. The patient is not nervous/anxious. HISTORY OF PRESENT ILLNESS     Patient presents today for completion of [facility] BM requirement for 's license. Patient is followed by neurology Dr. Livia Viera. Noted that last appointment was in 2020, patient received this documentation last week and was given 30 days in order to complete. His current neurology office is unable to see him until October. Review of patient's record note that patient's last seizure activity was documented in . He has been seizure-free at this point and remains on 2000 mg of Depakote daily.   Patient is scheduled to have his lab work completed for evaluation of therapeutic levels. The documentation will not be signed over and released until blood work has been completed. Patient reports that he has no vision abnormalities. Is no muscular disorder such as loss of limb. He has no history of stroke heart attack chest pain, heart failure, or hypertension. Has no respiratory disease such as emphysema or asthma. Has no history of diabetes or other endocrine disorders required medication. No significant impairment due to drugs and alcohol. Does report that he is an occasional drinker but is controlled. No history of psychiatric disorders. No cognitive or learning impairments. Eyes any history of MVA or speeding tickets to record. Seizures  This is a chronic problem. The problem occurs rarely. Progression since onset: controlled - no seizure activity since 2017. Pertinent negatives include no arthralgias, chest pain, congestion, coughing, fatigue, headaches, sore throat or weakness. Nothing aggravates the symptoms. He has tried nothing for the symptoms. The treatment provided significant relief. PHYSICAL EXAM:     /76   Pulse 76   Temp 97.2 °F (36.2 °C) (Temporal)   Ht 5' 11\" (1.803 m)   Wt 148 lb (67.1 kg)   SpO2 98%   BMI 20.64 kg/m²      Physical Exam  Vitals reviewed. Constitutional:       Appearance: Normal appearance. He is not ill-appearing. Cardiovascular:      Rate and Rhythm: Normal rate and regular rhythm. Heart sounds: No murmur heard. No friction rub. No gallop. Pulmonary:      Effort: Pulmonary effort is normal. No respiratory distress. Breath sounds: No wheezing. Abdominal:      General: Bowel sounds are normal.      Palpations: Abdomen is soft. Tenderness: There is no abdominal tenderness. Musculoskeletal:      Right lower leg: No edema. Left lower leg: No edema. Skin:     General: Skin is warm. Findings: No erythema, lesion or rash.    Neurological: Mental Status: He is alert. Cranial Nerves: Cranial nerves are intact. Sensory: Sensation is intact. Motor: Motor function is intact. Coordination: Coordination is intact. Gait: Gait is intact. Psychiatric:         Mood and Affect: Mood normal.         Behavior: Behavior is cooperative. PROCEDURE/ IN OFFICE TESTING     No in office testing or procedures completed during today's office visit. ASSESSMENT/PLAN/ FOLLOWUP:     1. Generalized idiopathic epilepsy, not intractable, without status epilepticus ([facility] [address]  [place] BMV form completed  -complete labs prior to release - to verify taking medication as ordered  --healthy diet as discussed  -daily exercise with in physical limitations    2. Encounter for screening for human immunodeficiency virus (HIV)  -complete labs prior to next appointment  -screening to be completed   -     HIV Screen; Future  3. Need for hepatitis C screening test  -complete labs prior to next appointment  -screening to be completed  -     Hepatitis C Antibody; Future      Return in about 6 months (around 1/12/2022) for Annual physical and lab followup. COMMUNICATION:       On this date 7/12/2021 I have spent 30 minutes reviewing previous notes, test results and face to face with the patient discussing the diagnosis and importance of compliance with the treatment plan as well as documenting on the day of the visit. The best way to find yourself is to lose yourself in the service of others - [address] [place] [address]. [address] [place] [address]   [email or web address]  Office: [phone]     An electronic signature was used to authenticate this note.   Signed by ANDREEA Wagoner CNP, APRN-CNP on 7/12/2021 at 1:08 PM

## 2021-07-13 LAB — HEPATITIS C ANTIBODY: NONREACTIVE

## 2021-07-19 ENCOUNTER — TELEPHONE (OUTPATIENT)
Dept: FAMILY MEDICINE CLINIC | Age: 31
End: 2021-07-19

## 2021-07-19 NOTE — TELEPHONE ENCOUNTER
Patient called in asking if his disability forms have been completed yet ? Would like a call as soon as forms are completed.

## 2021-07-20 NOTE — PROGRESS NOTES
Patient will need to be notified, that he will need to establish his regular care with a provider. Dr. Leah Gomez is not his PCP as initially indicated in Chart, I will be happy to continue to see him, He will need to have his annual visit completed as we discussed in hiis last appointment. Once scheduled, I will order his annual labs.

## 2021-08-26 ENCOUNTER — TELEPHONE (OUTPATIENT)
Dept: NEUROLOGY | Age: 31
End: 2021-08-26

## 2021-08-26 NOTE — TELEPHONE ENCOUNTER
Received pt's Depakote from [employer]. I called him, LM that we have it. I also called the house number and spoke with his mother. She stated that she will pick it up tomorrow.

## 2021-10-04 ENCOUNTER — TELEPHONE (OUTPATIENT)
Dept: NEUROLOGY | Age: 31
End: 2021-10-04

## 2021-10-04 NOTE — TELEPHONE ENCOUNTER
We received a fax from [email or web address] [facility]. Patient had been receiving this through [facility].  I placed a call to the patient this afternoon. Patient's grandmother stated that he had already left for work. Grandmother did admit that Mauricio Villalta now has Rx coverage, however there is still a question if Titi Shah will cover him. I suggested that [facility] try to see if he could still be covered with [employer]. Patient has a follow up tomorrow and he will bring the form along with him. Discussed with grandmother about holding [employer] request for now.

## 2021-10-08 ENCOUNTER — TELEPHONE (OUTPATIENT)
Dept: NEUROLOGY | Age: 31
End: 2021-10-08

## 2021-10-28 NOTE — TELEPHONE ENCOUNTER
Patient cancelled his 10/5/21 follow up. He is scheduled to see Dr. Filiberto Magallanes on 11/2/21. Patient hasn't been seen since 9/24/2020. Form will be in file folder 13. This is noted on the appt line as well.

## 2021-12-02 ENCOUNTER — TELEPHONE (OUTPATIENT)
Dept: NEUROLOGY | Age: 31
End: 2021-12-02

## 2021-12-02 NOTE — TELEPHONE ENCOUNTER
Received notice from [facility] that Marjorie Roberts is no longer eligible for their free drug program since he has RX coverage. I called and lm that we received the notice and he was sent a copy too. He should let us know what pharmacy he wants the RX to be sent to.

## 2021-12-03 DIAGNOSIS — G40.309 GENERALIZED IDIOPATHIC EPILEPSY, NOT INTRACTABLE, WITHOUT STATUS EPILEPTICUS (HCC): ICD-10-CM

## 2021-12-03 RX ORDER — DIVALPROEX SODIUM 500 MG
TABLET, EXTENDED RELEASE 24 HR ORAL
Qty: 360 TABLET | Refills: 0 | Status: SHIPPED | OUTPATIENT
Start: 2021-12-03 | End: 2021-12-10 | Stop reason: SDUPTHER

## 2021-12-10 ENCOUNTER — TELEPHONE (OUTPATIENT)
Dept: NEUROLOGY | Age: 31
End: 2021-12-10

## 2021-12-10 DIAGNOSIS — G40.309 GENERALIZED IDIOPATHIC EPILEPSY, NOT INTRACTABLE, WITHOUT STATUS EPILEPTICUS (HCC): ICD-10-CM

## 2021-12-10 RX ORDER — DIVALPROEX SODIUM 500 MG
TABLET, EXTENDED RELEASE 24 HR ORAL
Qty: 60 TABLET | Refills: 0 | Status: SHIPPED | OUTPATIENT
Start: 2021-12-10 | End: 2021-12-17 | Stop reason: SDUPTHER

## 2021-12-10 NOTE — TELEPHONE ENCOUNTER
Patient requesting short supply at local pharmacy until PA approved for mail order.     Pharmacy requesting a  refill of: Depkote ER 500mg MALICK     Medication active on med list yes     Date of last prescription: 12/03/2021  with 0  refills verified on 12/10/2021  verified by ESTELITA RUSSO     Date of last appointment: 09/24/2021     Next Visit Date: 12/14/2021

## 2021-12-14 ENCOUNTER — OFFICE VISIT (OUTPATIENT)
Dept: NEUROLOGY | Age: 31
End: 2021-12-14
Payer: COMMERCIAL

## 2021-12-14 VITALS
HEART RATE: 68 BPM | BODY MASS INDEX: 21.19 KG/M2 | SYSTOLIC BLOOD PRESSURE: 127 MMHG | HEIGHT: 70 IN | WEIGHT: 148 LBS | DIASTOLIC BLOOD PRESSURE: 87 MMHG

## 2021-12-14 DIAGNOSIS — G44.219 EPISODIC TENSION-TYPE HEADACHE, NOT INTRACTABLE: ICD-10-CM

## 2021-12-14 DIAGNOSIS — G25.2 FINE TREMOR: ICD-10-CM

## 2021-12-14 DIAGNOSIS — G40.309 GENERALIZED IDIOPATHIC EPILEPSY, NOT INTRACTABLE, WITHOUT STATUS EPILEPTICUS (HCC): Primary | ICD-10-CM

## 2021-12-14 PROCEDURE — 99214 OFFICE O/P EST MOD 30 MIN: CPT | Performed by: PSYCHIATRY & NEUROLOGY

## 2021-12-14 PROCEDURE — G8420 CALC BMI NORM PARAMETERS: HCPCS | Performed by: PSYCHIATRY & NEUROLOGY

## 2021-12-14 PROCEDURE — 1036F TOBACCO NON-USER: CPT | Performed by: PSYCHIATRY & NEUROLOGY

## 2021-12-14 PROCEDURE — G8427 DOCREV CUR MEDS BY ELIG CLIN: HCPCS | Performed by: PSYCHIATRY & NEUROLOGY

## 2021-12-14 PROCEDURE — G8484 FLU IMMUNIZE NO ADMIN: HCPCS | Performed by: PSYCHIATRY & NEUROLOGY

## 2021-12-14 NOTE — PROGRESS NOTES
Constitutional [] Weight loss/gain   [] Fatigue  [] Fever/Chills   HEENT [] Hearing Loss  [] Visual Disturbance  [] Tinnitus  [] Eye pain   Respiratory [] Shortness of Breath  [] Cough  [] Snoring   Cardiovascular [] Chest Pain  [] Palpitations  [] Lightheaded   GI [] Constipation  [] Diarrhea  [] Swallowing change  [] Nausea/vomiting    [] Urinary Frequency  [] Urinary Urgency   Musculoskeletal [] Neck pain  [] Back pain  [] Muscle pain  [] Restless legs   Dermatologic [] Skin changes   Neurologic [] Memory loss/confusion  [x] Seizures  [] Trouble walking or imbalance  [] Dizziness  [] Sleep disturbance  [] Weakness  [] Numbness  [] Tremors  [] Speech Difficulty  [] Headaches  [] Light Sensitivity  [] Sound Sensitivity   Endocrinology []Excessive thirst  []Excessive hunger   Psychiatric [] Anxiety/Depression  [] Hallucination   Allergy/immunology []Hives/environmental allergies   Hematologic/lymph [] Abnormal bleeding  [] Abnormal bruising

## 2021-12-14 NOTE — TELEPHONE ENCOUNTER
They approved it. I spoke with Kathy Barnhart about it today. I also gave him a  of the letter from [employer] that outlines he may be able to get copay assistance even if he can't get free drug from them. He voiced understanding.

## 2021-12-14 NOTE — PROGRESS NOTES
NEUROLOGY FOLLOW UP VISIT  Patient Name:       Sena Christianson  :        1990  Clinic Visit Date:    2021  LOV: 2020      Dear Dr. Pretty Blankenship MD     I saw Mr. Sena Christianson in follow-up in the office today in continuation of neurologic care. As you know he  is a 33 y.o. right handed  male with juvenile myoclonic epilepsy; generalized epilepsy since . He comes to clinic stating that he has been doing extremely well. He is not having any tremors as he was having them in the past.  Presently he offers no new complaints. He also stated that he started a new job and has been doing extremely well. He also has not had any headaches. Denied weight gain. He denied spells of confusion. He does not want to consider changing Depakote. He has not had any seizure activity since May 2017. He does have occasional pressure-like headaches but denied photophobia and phonophobia. Denied nausea and vomiting. OTC meds would help; those headaches were associated with sinusitis; does not want any new medications. Of note: he has long standing hx of seizure disorder since age 13 in 1. His seizures were described as generalized tonic-clonic seizures with intermittent myoclonic seizures. He has had aura prior to having the seizure and has had abnormal tingling sensations involving extremities and he would lose awareness with loss of consciousness. He has had tongue bite in the past but never had bladder incontinence. Also denied bowel incontinence. His seizure frequency is quite variable. He has much more number of seizures in the past occurring 2 or 3 seizures yearly but for the last \"couple of years, they're occurring one seizure every other year or so\". At times his seizures occur in clusters. He admits to medication compliance. He never had any medication related adverse effects from Depakote. Denied family history of seizures.   Denied history of meningitis and encephalitis. Denied history of head injury or trauma. His seizures used to occur predominantly during day time. His family members and friends had witnessed those seizures he has had CT head and MRI brain in the past and those were unremarkable. He also had EEG with significant abnormalities revealing 4-6 hertz generalized spike and slow wave discharges. He also feels \"Depakote has been controlling seizures well\". Denies history of photosensitivity. Review of systems done by staff reviewed and pertinent positives include Absence of recurrence of seizure activity. No recent hospitalizations since last visit. Previous Neurological Work-up:   CT Head ( 9/26/15 ): No evidence for acute intracranial pathology. No intracranial hemorrhage, mass; mass effect or midline shift. MRI Brain (7/31/14): normal.    EEG 2006: 4-6 hertz spike/wave generalized discharges. Current Outpatient Medications on File Prior to Visit   Medication Sig Dispense Refill    DEPAKOTE  MG extended release tablet Take 4 tablets a bedtime. MALICK 60 tablet 0     No current facility-administered medications on file prior to visit. Allergies: Ethan Jasmine has No Known Allergies. Past Medical History:   Diagnosis Date    Epilepsy ([address]     Scoliosis        Past Surgical History:   Procedure Laterality Date    BACK SURGERY      HERNIA REPAIR       Social History: Ethan Jasmine  reports that he quit smoking about 3 years ago. His smoking use included cigarettes. He has a 2.75 pack-year smoking history. He has never used smokeless tobacco. He reports current alcohol use. He reports previous drug use. Drug: Marijuana Jesika González).     Family History   Problem Relation Age of Onset    Diabetes Maternal Grandmother     High Blood Pressure Maternal Grandmother     Cancer Maternal Grandmother         breast    High Blood Pressure Maternal Grandfather     Heart Disease Maternal Grandfather     Migraines Mother  Diabetes Maternal Uncle        On exam: Blood pressure 127/87, pulse 68, height 5' 10\" (1.778 m), weight 148 lb (67.1 kg). General  appears comfortable and interactive and in no distress. HEENT  NC/ AT   NECK  Supple and no bruits heard   MENTAL STATUS:  Alert, oriented x3, good naming and good repetition; follows one-step as well as three-step command; could recall major news events well; could do serial subtractions well; no dysarthria noted; normal language, no hallucinations or delusions. CRANIAL NERVES: II     -      PERRLA, Fundi reveal intact venous pulsations;  Visual fields intact to confrontation  III,IV,VI -  EOMs full, no afferent defect, no  SANDEEP, no ptosis  V     -     Normal facial sensation  VII    -     Normal facial symmetry  VIII   -     Intact hearing  IX,X -     Symmetrical palate  XI    -     Symmetrical shoulder shrug  XII   -     Midline tongue, no atrophy    MOTOR FUNCTION:  significant for good strength of grade 5/5 in bilateral proximal and distal muscle groups of both upper and lower extremities with normal bulk, normal tone and no involuntary movements, no tremor   SENSORY FUNCTION:  Normal touch, normal pin, normal vibration, normal proprioception   CEREBELLAR FUNCTION:  Intact fine motor control over upper limbs   REFLEX FUNCTION:  Symmetric, no perverted reflex, no Babinski sign   STATION and GAIT  Normal station, normal gait       Diagnostic data reviewed with the patient:       CBC:     Lab Results   Component Value Date    WBC 6.6 03/13/2020    HGB 16.3 03/13/2020     03/13/2020      BMP:     Lab Results   Component Value Date     03/13/2020    K 4.5 03/13/2020    GLUCOSE 74 03/13/2020    CALCIUM 9.8 03/13/2020       Lab Results   Component Value Date    VALPROATE TOTAL LEVEL 91 7/12/21    VALPROATE FREE LEVEL 8.3 7/12/21       Lab Results   Component Value Date    ALT 23 07/12/2021    AST 27 07/12/2021    ALKPHOS 41 07/12/2021    BILITOT 0.34 07/12/2021

## 2021-12-17 ENCOUNTER — TELEPHONE (OUTPATIENT)
Dept: NEUROLOGY | Age: 31
End: 2021-12-17

## 2021-12-17 DIAGNOSIS — G40.309 GENERALIZED IDIOPATHIC EPILEPSY, NOT INTRACTABLE, WITHOUT STATUS EPILEPTICUS (HCC): ICD-10-CM

## 2021-12-17 RX ORDER — DIVALPROEX SODIUM 500 MG
TABLET, EXTENDED RELEASE 24 HR ORAL
Qty: 120 TABLET | Refills: 6 | Status: SHIPPED | OUTPATIENT
Start: 2021-12-17 | End: 2022-03-12

## 2022-02-16 ENCOUNTER — TELEPHONE (OUTPATIENT)
Dept: NEUROLOGY | Age: 32
End: 2022-02-16

## 2022-02-16 NOTE — TELEPHONE ENCOUNTER
Received shipment of pt's Depakote from [employer]. I called pt's grandmother, Patrick Conley. I informed her that it is available for pickup. She stated she would be in tomorrow to get it.

## 2022-05-12 ENCOUNTER — TELEPHONE (OUTPATIENT)
Dept: NEUROLOGY | Age: 32
End: 2022-05-12

## 2022-05-12 NOTE — TELEPHONE ENCOUNTER
Received pt's Depakote from [employer]. I called pt's grandmother, LM that we got it today and that she is able to pick it up. I asked that she call back and let me know that she got this message. Never smoker

## 2022-08-31 ENCOUNTER — TELEPHONE (OUTPATIENT)
Dept: NEUROLOGY | Age: 32
End: 2022-08-31

## 2022-08-31 NOTE — TELEPHONE ENCOUNTER
Received [place]'s Depakote from the pt. assistance program. I called and let his grandmother know it was here to . She stated she will come and pick it up.

## 2022-11-28 DIAGNOSIS — G40.309 GENERALIZED IDIOPATHIC EPILEPSY, NOT INTRACTABLE, WITHOUT STATUS EPILEPTICUS (HCC): ICD-10-CM

## 2022-11-28 RX ORDER — DIVALPROEX SODIUM 500 MG
TABLET, EXTENDED RELEASE 24 HR ORAL
Qty: 120 TABLET | Refills: 12 | Status: SHIPPED | OUTPATIENT
Start: 2022-11-28 | End: 2023-02-21

## 2022-11-28 NOTE — TELEPHONE ENCOUNTER
Pt's grandmother called in stating that pt only has one bottle left on his free Depakote delivery from [employer]. They gave her a high priority fax # to send the script to, [phone]. This was done.

## 2022-12-07 ENCOUNTER — TELEPHONE (OUTPATIENT)
Dept: NEUROLOGY | Age: 32
End: 2022-12-07

## 2022-12-07 NOTE — TELEPHONE ENCOUNTER
Received pt's Depakote from [employer]. I called Alan Deras letting her know that we received it. I asked her to let me know when she can pick it up.

## 2022-12-14 ENCOUNTER — OFFICE VISIT (OUTPATIENT)
Dept: NEUROLOGY | Age: 32
End: 2022-12-14

## 2022-12-14 VITALS
WEIGHT: 144 LBS | BODY MASS INDEX: 20.62 KG/M2 | HEART RATE: 76 BPM | HEIGHT: 70 IN | DIASTOLIC BLOOD PRESSURE: 92 MMHG | SYSTOLIC BLOOD PRESSURE: 138 MMHG

## 2022-12-14 DIAGNOSIS — G40.B09 NONINTRACTABLE JUVENILE MYOCLONIC EPILEPSY WITHOUT STATUS EPILEPTICUS (HCC): Primary | ICD-10-CM

## 2022-12-14 PROCEDURE — 99214 OFFICE O/P EST MOD 30 MIN: CPT | Performed by: PSYCHIATRY & NEUROLOGY

## 2022-12-14 NOTE — PROGRESS NOTES
NEUROLOGY FOLLOW UP VISIT  Patient Name:       Dilcia Vance  :        1990  Clinic Visit Date:    2022  LOV: 2020      Dear Dr. Jewel Duenas MD     I saw Mr. Dilcia Vance in follow-up in the office today in continuation of neurologic care. As you know he  is a 27 y.o. right handed  male with juvenile myoclonic epilepsy; generalized epilepsy since . He comes to clinic stating that he has been doing extremely well. He is not having any tremors as he was having them in the past.  Presently he offers no new complaints. He also stated that he started a new job and has been doing extremely well. He also has not had any headaches. Denied weight gain. He denied spells of confusion. He does not want to consider changing Depakote. He has not had any seizure activity since May 2017. He does have occasional pressure-like headaches but denied photophobia and phonophobia. Denied nausea and vomiting. OTC meds would help; those headaches were associated with sinusitis; does not want any new medications. The patient is presenting as a follow-up on 2022. He denies any breakthrough seizures. He admits to medication compliance. He admits to smoking marijuana and admits to drinking alcohol on social occasions. Of note: he has long standing hx of seizure disorder since age 13 in 1. His seizures were described as generalized tonic-clonic seizures with intermittent myoclonic seizures. He has had aura prior to having the seizure and has had abnormal tingling sensations involving extremities and he would lose awareness with loss of consciousness. He has had tongue bite in the past but never had bladder incontinence. Also denied bowel incontinence. His seizure frequency is quite variable. He has much more number of seizures in the past occurring 2 or 3 seizures yearly but for the last \"couple of years, they're occurring one seizure every other year or so\".   At times his seizures occur in clusters. He admits to medication compliance. He never had any medication related adverse effects from Depakote. Denied family history of seizures. Denied history of meningitis and encephalitis. Denied history of head injury or trauma. His seizures used to occur predominantly during day time. His family members and friends had witnessed those seizures he has had CT head and MRI brain in the past and those were unremarkable. He also had EEG with significant abnormalities revealing 4-6 hertz generalized spike and slow wave discharges. He also feels \"Depakote has been controlling seizures well\". Denies history of photosensitivity. Review of systems done by staff reviewed and pertinent positives include Absence of recurrence of seizure activity. No recent hospitalizations since last visit. Previous Neurological Work-up:   CT Head ( 9/26/15 ): No evidence for acute intracranial pathology. No intracranial hemorrhage, mass; mass effect or midline shift. MRI Brain (7/31/14): normal.    EEG 2006: 4-6 hertz spike/wave generalized discharges. Current Outpatient Medications on File Prior to Visit   Medication Sig Dispense Refill    DEPAKOTE  MG extended release tablet Take 4 tablets a bedtime. MALICK 120 tablet 12     No current facility-administered medications on file prior to visit. Allergies: Soo Nam has No Known Allergies. Past Medical History:   Diagnosis Date    Epilepsy ([address]     Scoliosis        Past Surgical History:   Procedure Laterality Date    BACK SURGERY      HERNIA REPAIR       Social History: Soo Nam  reports that he quit smoking about 4 years ago. His smoking use included cigarettes. He has a 2.75 pack-year smoking history. He has never used smokeless tobacco. He reports current alcohol use. He reports that he does not currently use drugs after having used the following drugs: Marijuana Brenda Reyes).     Family History Problem Relation Age of Onset    Diabetes Maternal Grandmother     High Blood Pressure Maternal Grandmother     Cancer Maternal Grandmother         breast    High Blood Pressure Maternal Grandfather     Heart Disease Maternal Grandfather     Migraines Mother     Diabetes Maternal Uncle        On exam: There were no vitals taken for this visit. General  appears comfortable and interactive and in no distress. HEENT  NC/ AT   NECK  Supple and no bruits heard   MENTAL STATUS:  Alert, oriented x3, good naming and good repetition; follows one-step as well as three-step command; could recall major news events well; could do serial subtractions well; no dysarthria noted; normal language, no hallucinations or delusions. CRANIAL NERVES: II     -      PERRLA, Fundi reveal intact venous pulsations;  Visual fields intact to confrontation  III,IV,VI -  EOMs full, no afferent defect, no  SANDEEP, no ptosis  V     -     Normal facial sensation  VII    -     Normal facial symmetry  VIII   -     Intact hearing  IX,X -     Symmetrical palate  XI    -     Symmetrical shoulder shrug  XII   -     Midline tongue, no atrophy    MOTOR FUNCTION:  significant for good strength of grade 5/5 in bilateral proximal and distal muscle groups of both upper and lower extremities with normal bulk, normal tone and no involuntary movements, no tremor   SENSORY FUNCTION:  Normal touch, normal pin, normal vibration, normal proprioception   CEREBELLAR FUNCTION:  Intact fine motor control over upper limbs   REFLEX FUNCTION:  Symmetric, no perverted reflex, no Babinski sign   STATION and GAIT  Normal station, normal gait       Diagnostic data reviewed with the patient:       CBC:     Lab Results   Component Value Date    WBC 6.6 03/13/2020    HGB 16.3 03/13/2020     03/13/2020      BMP:     Lab Results   Component Value Date     03/13/2020    K 4.5 03/13/2020    GLUCOSE 74 03/13/2020    CALCIUM 9.8 03/13/2020       Lab Results   Component Value Date    VALPROATE TOTAL LEVEL 91 7/12/21    VALPROATE FREE LEVEL 8.3 7/12/21       Lab Results   Component Value Date    ALT 23 07/12/2021    AST 27 07/12/2021    ALKPHOS 41 07/12/2021    BILITOT 0.34 07/12/2021               Impression and Plan: Mr. Siena South is a 27 y.o. male with   Juvenile myoclonic epilepsy; generalized epilepsy since 2002; stable on Depakote ER 2gr daily; last sz  May 2017.,  Will check valproic acid free and total levels; CBC, CMP and therapeutic monitoring. Depakote related tremors \"come and go\" \"not getting any worse\" \" tolerable and doesn't want any meds\"  Work related ha; with sinusitis; tolerable and does not want any meds for headaches. The patient denies any breakthrough seizures since the previous visit. He is currently on Depakote 2 g nightly. I recommend he continue with this regimen. I also recommend he get his CMP and CBC checked to ensure the liver is functioning optimally as the patient is taking Depakote. All medication side effects were discussed and questions answered. Patient to follow up in 1 year or sooner if symptoms worsen. This note was partially created using voice recognition software and is inherently subject to errors including those of syntax and \"sound alike\" substitutions which may escape proofreading. In such instances, original meaning may be extrapolated by contextual derivation.

## 2023-01-10 ENCOUNTER — TELEPHONE (OUTPATIENT)
Dept: NEUROLOGY | Age: 33
End: 2023-01-10

## 2023-01-10 NOTE — TELEPHONE ENCOUNTER
Received in the mail a new application for pt's Depakote, that he gets from [employer]. Form was filled out and mailed back to Javier, as requested.

## 2023-03-24 ENCOUNTER — TELEPHONE (OUTPATIENT)
Dept: NEUROLOGY | Age: 33
End: 2023-03-24

## 2023-03-24 NOTE — TELEPHONE ENCOUNTER
Patient's Mother Paulina Lee (HIPAA) called she cannot get her son's medication Brand Name Depakote  mg from [facility].  Writer did call and confirm they cannot ship it because there just isn't any available anywhere at this time  Writer called several pharmacies even [facility] and was told they just are out of stock and cannot get it. Patient has two weeks left of Depakote  mg. Please advise.

## 2023-03-27 NOTE — TELEPHONE ENCOUNTER
Roxanne Wolff called from [facility] and stated that they now have the medication (Depakote  mg) available to them. He asked for a verbal over the phone to confirm that they can ship the medication directly to the patient instead of sending it to the office. I confirmed with Inda Klinefelter, RN that it was okay to give a verbal. I asked Roxanne Wolff to follow with with the patient to make sure that, that is what they wanted to do as well. He verbalized understanding and stated that he would reach out to the patient.

## 2023-03-27 NOTE — TELEPHONE ENCOUNTER
Sandrita Brasher called the office again in regard to the previously stated information. Please advise.

## 2023-05-10 ENCOUNTER — TELEPHONE (OUTPATIENT)
Dept: NEUROLOGY | Age: 33
End: 2023-05-10

## 2023-05-10 NOTE — TELEPHONE ENCOUNTER
[facility]. I called and lm on voice mail.

## 2023-09-26 ENCOUNTER — TELEPHONE (OUTPATIENT)
Dept: NEUROLOGY | Age: 33
End: 2023-09-26

## 2023-09-26 NOTE — TELEPHONE ENCOUNTER
Received pt's Depakote shipment from [facility], free of charge to the patient. I called Shi Stewart, pt's grandmother, LM that it was in office and available for . I asked that she call back to let us know when she can .

## 2023-12-27 ENCOUNTER — TELEPHONE (OUTPATIENT)
Dept: NEUROLOGY | Age: 33
End: 2023-12-27

## 2023-12-27 ENCOUNTER — HOSPITAL ENCOUNTER (OUTPATIENT)
Age: 33
Setting detail: SPECIMEN
Discharge: HOME | End: 2023-12-27

## 2023-12-27 LAB
ALBUMIN SERPL-MCNC: 4.6 G/DL (ref 3.5–5.2)
ALBUMIN/GLOB SERPL: 1.5 {RATIO} (ref 1–2.5)
ALP SERPL-CCNC: 45 U/L (ref 40–129)
ALT SERPL-CCNC: 24 U/L (ref 5–41)
ANION GAP SERPL CALCULATED.3IONS-SCNC: 12 MMOL/L (ref 9–17)
AST SERPL-CCNC: 29 U/L
BILIRUB SERPL-MCNC: 0.4 MG/DL (ref 0.3–1.2)
BUN SERPL-MCNC: 20 MG/DL (ref 6–20)
CALCIUM SERPL-MCNC: 9.6 MG/DL (ref 8.6–10.4)
CHLORIDE SERPL-SCNC: 100 MMOL/L (ref 98–107)
CO2 SERPL-SCNC: 26 MMOL/L (ref 20–31)
CREAT SERPL-MCNC: 0.9 MG/DL (ref 0.7–1.2)
ERYTHROCYTE [DISTWIDTH] IN BLOOD BY AUTOMATED COUNT: 13.5 % (ref 11.8–14.4)
GFR SERPL CREATININE-BSD FRML MDRD: >60 ML/MIN/1.73M2
GLUCOSE SERPL-MCNC: 133 MG/DL (ref 70–99)
HCT VFR BLD AUTO: 49.3 % (ref 40.7–50.3)
HGB BLD-MCNC: 16.3 G/DL (ref 13–17)
MCH RBC QN AUTO: 30.5 PG (ref 25.2–33.5)
MCHC RBC AUTO-ENTMCNC: 33.1 G/DL (ref 28.4–34.8)
MCV RBC AUTO: 92.3 FL (ref 82.6–102.9)
NRBC BLD-RTO: 0 PER 100 WBC
PLATELET # BLD AUTO: 173 K/UL (ref 138–453)
PMV BLD AUTO: 11.9 FL (ref 8.1–13.5)
POTASSIUM SERPL-SCNC: 3.8 MMOL/L (ref 3.7–5.3)
PROT SERPL-MCNC: 7.6 G/DL (ref 6.4–8.3)
RBC # BLD AUTO: 5.34 M/UL (ref 4.21–5.77)
SODIUM SERPL-SCNC: 138 MMOL/L (ref 135–144)
WBC OTHER # BLD: 7.1 K/UL (ref 3.5–11.3)

## 2023-12-27 NOTE — TELEPHONE ENCOUNTER
Received Depakote from [facility]. I called and spoke with Karuna Maradiaga. They will  at pt's scheduled appt tomorrow, 12/28/23.

## 2023-12-28 ENCOUNTER — OFFICE VISIT (OUTPATIENT)
Dept: NEUROLOGY | Age: 33
End: 2023-12-28

## 2023-12-28 ENCOUNTER — TELEPHONE (OUTPATIENT)
Dept: NEUROLOGY | Age: 33
End: 2023-12-28

## 2023-12-28 VITALS
DIASTOLIC BLOOD PRESSURE: 93 MMHG | HEIGHT: 70 IN | HEART RATE: 89 BPM | SYSTOLIC BLOOD PRESSURE: 146 MMHG | BODY MASS INDEX: 21.19 KG/M2 | WEIGHT: 148 LBS

## 2023-12-28 DIAGNOSIS — Z79.899 LONG TERM CURRENT USE OF THERAPEUTIC DRUG: ICD-10-CM

## 2023-12-28 DIAGNOSIS — G40.309 GENERALIZED IDIOPATHIC EPILEPSY, NOT INTRACTABLE, WITHOUT STATUS EPILEPTICUS (HCC): ICD-10-CM

## 2023-12-28 DIAGNOSIS — G40.B09 NONINTRACTABLE JUVENILE MYOCLONIC EPILEPSY WITHOUT STATUS EPILEPTICUS (HCC): Primary | ICD-10-CM

## 2023-12-28 LAB — 25(OH)D3 SERPL-MCNC: 42.9 NG/ML (ref 30–100)

## 2023-12-28 PROCEDURE — 99214 OFFICE O/P EST MOD 30 MIN: CPT | Performed by: PSYCHIATRY & NEUROLOGY

## 2023-12-28 NOTE — PROGRESS NOTES
file   Tobacco Use    Smoking status: Every Day     Current packs/day: 0.00     Average packs/day: 0.5 packs/day for 11.0 years (5.5 ttl pk-yrs)     Types: Cigarettes     Start date: 2007     Last attempt to quit: 2018     Years since quittin.2    Smokeless tobacco: Never    Tobacco comments:     pt uses electronic cigarettes   Vaping Use    Vaping Use: Some days    Substances: Always   Substance and Sexual Activity    Alcohol use: Yes     Alcohol/week: 0.0 standard drinks of alcohol     Comment: occassionally    Drug use: Yes     Types: Marijuana Francisco Putty)     Comment: FORMER USE    Sexual activity: Yes     Partners: Female   Other Topics Concern    Not on file   Social History Narrative    Not on file     Social Determinants of Health     Financial Resource Strain: Low Risk  (2021)    Overall Financial Resource Strain (CARDIA)     Difficulty of Paying Living Expenses: Not hard at all   Food Insecurity: No Food Insecurity (2021)    Hunger Vital Sign     Worried About Running Out of Food in the Last Year: Never true     Ran Out of Food in the Last Year: Never true   Transportation Needs: Not on file   Physical Activity: Not on file   Stress: Not on file   Social Connections: Not on file   Intimate Partner Violence: Not on file   Housing Stability: Not on file      There were no vitals taken for this visit. General appearence: Normal. Well groomed.  In no acute distress    Head: Normocephalic, atraumatic  Eyes: Extraocular movements intact, eye lids normal  Lungs: Respirations unlabored, chest wall no deformity  ENT: Normal external ear canals, no sinus tenderness  Heart: Regular rate rhythm  Abdomen: No masses, tenderness  Extremities: No cyanosis or edema, 2+ pulses  Musculoskeletal: Normal range of motion in all joints  Skin: Intact, normal skin color    HEENT [] Hearing Loss  [] Visual Disturbance  [] Tinnitus  [] Eye pain   Respiratory [] Shortness of Breath  [] Cough  [] Snoring

## 2024-02-05 ENCOUNTER — TELEPHONE (OUTPATIENT)
Dept: NEUROLOGY | Age: 34
End: 2024-02-05

## 2024-02-05 RX ORDER — DIVALPROEX SODIUM 250 MG/1
2000 TABLET, EXTENDED RELEASE ORAL DAILY
COMMUNITY

## 2024-02-05 NOTE — TELEPHONE ENCOUNTER
Pt. grandmother called in.  She was inquiring if we had received a call from [employer]. They pt. assistance program does not have any 500 mg. available. We need to reissure it with 250 mg. strength with directions to equal his normal dose of 2000 mg.   Coreen CAPONE had a form but it had not been faxed back yet.   I faxed it in for her.  I entered it as a med for chart clarification

## 2024-04-04 ENCOUNTER — TELEPHONE (OUTPATIENT)
Dept: NEUROLOGY | Age: 34
End: 2024-04-04

## 2024-04-04 NOTE — TELEPHONE ENCOUNTER
We received pt's Depakote from [facility]. I called pt's grandmother and informed her. She stated that she will be in tomorrow (Friday) afternoon to pick it up. I informed her that it will be at the  for .

## 2024-04-26 NOTE — TELEPHONE ENCOUNTER
[facility] patient assistance form completed, signed by Dr. Dante Gifford and mailed to patient's home address. "I can't live like this anymore."

## 2024-07-15 ENCOUNTER — TELEPHONE (OUTPATIENT)
Dept: NEUROLOGY | Age: 34
End: 2024-07-15

## 2024-07-15 NOTE — TELEPHONE ENCOUNTER
Received pt's Depakote from [facility]. I called pt's grandmother and informed her. They will be in this week to pick it up.

## 2024-11-12 ENCOUNTER — TELEPHONE (OUTPATIENT)
Dept: NEUROLOGY | Age: 34
End: 2024-11-12

## 2024-11-12 NOTE — TELEPHONE ENCOUNTER
We received Kasia Abrahamte in the mail from [employer].  I called and lm on the home number and mobile number.

## 2024-12-08 NOTE — PROGRESS NOTES
NEUROLOGY FOLLOW UP VISIT  Patient Name:       Emir Riggs  :        1990  Clinic Visit Date:    2024  LOV: 2023         Dear Indigo Jang MD     I saw Mr. Emir Riggs in follow-up in the office today in continuation of neurologic care.  As you know he  is a 34 y.o. right handed  male with juvenile myoclonic epilepsy; generalized epilepsy since .  He has not had any sz activity since May 2017.   He has been on Depakote 2000 mg daily and tolerating it well without any anticipated adverse effects.  Since last visit in ; he has not had any hospitalizations and no seizure activity since last visit. Tolerating depakote well without any adverse effects.  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       Dec 2023 visit:  He comes to clinic stating that he has been doing extremely well.  He is not having any tremors as he was having them in the past.  Presently he offers no new complaints.  He also stated that he started a new job and has been doing extremely well.  He also has not had any headaches.  Denied weight gain.  He denied spells of confusion.  He does not want to consider changing Depakote.  He has not had any seizure activity since May 2017.  He does have occasional pressure-like headaches but denied photophobia and phonophobia.  Denied nausea and vomiting.  OTC meds would help; those headaches were associated with sinusitis; does not want any new medications.     Of note: he has long standing hx of seizure disorder since age 11 in .   His seizures were described as generalized tonic-clonic seizures with intermittent myoclonic seizures.  He has had aura prior to having the seizure and has had abnormal tingling sensations involving extremities and he would lose awareness with loss of consciousness.  He has had tongue bite in the past but never had bladder incontinence.

## 2024-12-09 ENCOUNTER — OFFICE VISIT (OUTPATIENT)
Dept: NEUROLOGY | Age: 34
End: 2024-12-09

## 2024-12-09 VITALS
HEIGHT: 70 IN | WEIGHT: 141.4 LBS | DIASTOLIC BLOOD PRESSURE: 84 MMHG | BODY MASS INDEX: 20.24 KG/M2 | HEART RATE: 61 BPM | SYSTOLIC BLOOD PRESSURE: 130 MMHG

## 2024-12-09 DIAGNOSIS — G44.229 CHRONIC TENSION-TYPE HEADACHE, NOT INTRACTABLE: ICD-10-CM

## 2024-12-09 DIAGNOSIS — Z79.899 LONG TERM CURRENT USE OF THERAPEUTIC DRUG: ICD-10-CM

## 2024-12-09 DIAGNOSIS — G40.309 GENERALIZED IDIOPATHIC EPILEPSY, NOT INTRACTABLE, WITHOUT STATUS EPILEPTICUS (HCC): ICD-10-CM

## 2024-12-09 DIAGNOSIS — G40.B09 NONINTRACTABLE JUVENILE MYOCLONIC EPILEPSY WITHOUT STATUS EPILEPTICUS (HCC): Primary | ICD-10-CM

## 2024-12-09 PROCEDURE — 99214 OFFICE O/P EST MOD 30 MIN: CPT | Performed by: PSYCHIATRY & NEUROLOGY

## 2024-12-09 RX ORDER — DIVALPROEX SODIUM 500 MG
TABLET, EXTENDED RELEASE 24 HR ORAL
Qty: 120 TABLET | Refills: 5 | Status: SHIPPED | OUTPATIENT
Start: 2024-12-09 | End: 2026-01-08

## 2025-01-17 ENCOUNTER — TELEPHONE (OUTPATIENT)
Dept: NEUROLOGY | Age: 35
End: 2025-01-17

## 2025-01-17 NOTE — TELEPHONE ENCOUNTER
01 17 2025 [employer] patient access support form paperwork was received. Blank copy of form has been scanned.

## 2025-05-12 ENCOUNTER — TELEPHONE (OUTPATIENT)
Dept: NEUROLOGY | Age: 35
End: 2025-05-12

## 2025-05-12 NOTE — TELEPHONE ENCOUNTER
Received pt's Depakote from [facility] today. I called pt's grandmother, JavierJulián ROJAS with his grandfather. He will let her know that it is available for  tomorrow at the .

## 2025-08-01 ENCOUNTER — TELEPHONE (OUTPATIENT)
Dept: NEUROLOGY | Age: 35
End: 2025-08-01

## 2025-08-04 DIAGNOSIS — G40.B09 NONINTRACTABLE JUVENILE MYOCLONIC EPILEPSY WITHOUT STATUS EPILEPTICUS (HCC): ICD-10-CM

## 2025-08-05 DIAGNOSIS — G40.B09 NONINTRACTABLE JUVENILE MYOCLONIC EPILEPSY WITHOUT STATUS EPILEPTICUS (HCC): ICD-10-CM

## 2025-08-25 ENCOUNTER — TELEPHONE (OUTPATIENT)
Dept: NEUROLOGY | Age: 35
End: 2025-08-25